# Patient Record
Sex: MALE | Race: BLACK OR AFRICAN AMERICAN
[De-identification: names, ages, dates, MRNs, and addresses within clinical notes are randomized per-mention and may not be internally consistent; named-entity substitution may affect disease eponyms.]

---

## 2018-10-09 ENCOUNTER — HOSPITAL ENCOUNTER (INPATIENT)
Dept: HOSPITAL 72 - EMR | Age: 83
LOS: 6 days | Discharge: NURSING, REHAB OR LONG-TERM CARE | DRG: 915 | End: 2018-10-15
Payer: MEDICARE

## 2018-10-09 VITALS — SYSTOLIC BLOOD PRESSURE: 146 MMHG | DIASTOLIC BLOOD PRESSURE: 83 MMHG

## 2018-10-09 VITALS — DIASTOLIC BLOOD PRESSURE: 69 MMHG | SYSTOLIC BLOOD PRESSURE: 135 MMHG

## 2018-10-09 VITALS — BODY MASS INDEX: 26.39 KG/M2 | WEIGHT: 188.5 LBS | HEIGHT: 71 IN

## 2018-10-09 VITALS — SYSTOLIC BLOOD PRESSURE: 136 MMHG | DIASTOLIC BLOOD PRESSURE: 72 MMHG

## 2018-10-09 VITALS — SYSTOLIC BLOOD PRESSURE: 143 MMHG | DIASTOLIC BLOOD PRESSURE: 73 MMHG
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
## 2018-10-09 DIAGNOSIS — Z91.013: ICD-10-CM

## 2018-10-09 DIAGNOSIS — I67.9: ICD-10-CM

## 2018-10-09 DIAGNOSIS — R26.81: ICD-10-CM

## 2018-10-09 DIAGNOSIS — G62.9: ICD-10-CM

## 2018-10-09 DIAGNOSIS — I10: ICD-10-CM

## 2018-10-09 DIAGNOSIS — I11.9: ICD-10-CM

## 2018-10-09 DIAGNOSIS — N40.0: ICD-10-CM

## 2018-10-09 DIAGNOSIS — T44.5X5A: ICD-10-CM

## 2018-10-09 DIAGNOSIS — T78.3XXA: Primary | ICD-10-CM

## 2018-10-09 DIAGNOSIS — Z91.018: ICD-10-CM

## 2018-10-09 DIAGNOSIS — J96.90: ICD-10-CM

## 2018-10-09 DIAGNOSIS — M54.9: ICD-10-CM

## 2018-10-09 DIAGNOSIS — F01.50: ICD-10-CM

## 2018-10-09 DIAGNOSIS — M54.10: ICD-10-CM

## 2018-10-09 DIAGNOSIS — G89.29: ICD-10-CM

## 2018-10-09 DIAGNOSIS — M19.90: ICD-10-CM

## 2018-10-09 DIAGNOSIS — J44.9: ICD-10-CM

## 2018-10-09 DIAGNOSIS — E78.5: ICD-10-CM

## 2018-10-09 DIAGNOSIS — Z87.891: ICD-10-CM

## 2018-10-09 DIAGNOSIS — Z95.0: ICD-10-CM

## 2018-10-09 LAB
ADD MANUAL DIFF: NO
ALBUMIN SERPL-MCNC: 3.4 G/DL (ref 3.4–5)
ALP SERPL-CCNC: 89 U/L (ref 46–116)
ALT SERPL-CCNC: 35 U/L (ref 12–78)
ANION GAP SERPL CALC-SCNC: 9 MMOL/L (ref 5–15)
APTT BLD: 30 SEC (ref 23–33)
AST SERPL-CCNC: 36 U/L (ref 15–37)
BASOPHILS NFR BLD AUTO: 0.7 % (ref 0–2)
BILIRUB SERPL-MCNC: 0.4 MG/DL (ref 0.2–1)
BUN SERPL-MCNC: 13 MG/DL (ref 7–18)
CALCIUM SERPL-MCNC: 9.7 MG/DL (ref 8.5–10.1)
CHLORIDE SERPL-SCNC: 103 MMOL/L (ref 98–107)
CO2 SERPL-SCNC: 26 MMOL/L (ref 21–32)
CREAT SERPL-MCNC: 1 MG/DL (ref 0.55–1.3)
EOSINOPHIL NFR BLD AUTO: 3.4 % (ref 0–3)
ERYTHROCYTE [DISTWIDTH] IN BLOOD BY AUTOMATED COUNT: 11.8 % (ref 11.6–14.8)
GLOBULIN SER-MCNC: 4.1 G/DL
HCT VFR BLD CALC: 42.6 % (ref 42–52)
HGB BLD-MCNC: 13.7 G/DL (ref 14.2–18)
INR PPP: 1 (ref 0.9–1.1)
LYMPHOCYTES NFR BLD AUTO: 38.2 % (ref 20–45)
MCV RBC AUTO: 97 FL (ref 80–99)
MONOCYTES NFR BLD AUTO: 12.9 % (ref 1–10)
NEUTROPHILS NFR BLD AUTO: 44.7 % (ref 45–75)
PLATELET # BLD: 293 K/UL (ref 150–450)
POTASSIUM SERPL-SCNC: 4.7 MMOL/L (ref 3.5–5.1)
RBC # BLD AUTO: 4.4 M/UL (ref 4.7–6.1)
SODIUM SERPL-SCNC: 138 MMOL/L (ref 136–145)
WBC # BLD AUTO: 5.5 K/UL (ref 4.8–10.8)

## 2018-10-09 PROCEDURE — 84484 ASSAY OF TROPONIN QUANT: CPT

## 2018-10-09 PROCEDURE — 85730 THROMBOPLASTIN TIME PARTIAL: CPT

## 2018-10-09 PROCEDURE — 85610 PROTHROMBIN TIME: CPT

## 2018-10-09 PROCEDURE — 99291 CRITICAL CARE FIRST HOUR: CPT

## 2018-10-09 PROCEDURE — 96374 THER/PROPH/DIAG INJ IV PUSH: CPT

## 2018-10-09 PROCEDURE — 71045 X-RAY EXAM CHEST 1 VIEW: CPT

## 2018-10-09 PROCEDURE — 36415 COLL VENOUS BLD VENIPUNCTURE: CPT

## 2018-10-09 PROCEDURE — 96375 TX/PRO/DX INJ NEW DRUG ADDON: CPT

## 2018-10-09 PROCEDURE — 87081 CULTURE SCREEN ONLY: CPT

## 2018-10-09 PROCEDURE — 83735 ASSAY OF MAGNESIUM: CPT

## 2018-10-09 PROCEDURE — 80053 COMPREHEN METABOLIC PANEL: CPT

## 2018-10-09 PROCEDURE — 93005 ELECTROCARDIOGRAM TRACING: CPT

## 2018-10-09 PROCEDURE — 83880 ASSAY OF NATRIURETIC PEPTIDE: CPT

## 2018-10-09 PROCEDURE — 84550 ASSAY OF BLOOD/URIC ACID: CPT

## 2018-10-09 PROCEDURE — 85025 COMPLETE CBC W/AUTO DIFF WBC: CPT

## 2018-10-09 NOTE — EMERGENCY ROOM REPORT
History of Present Illness


General


Chief Complaint:  General Complaint


Source:  Patient, Medical Record, EMS





Present Illness


HPI


83-year-old male, history of hypertension, coming from nursing home, for right 

lip and right facial swelling.  He is allergic to oranges and fish, but says 

that he did not consume this.  EMS states that nursing home said that he 

developed a right facial swelling right with swelling over the last few hours.  

Patient is asymptomatic.  No shortness of breath no feeling of his throat 

closing, no wheezing no nausea vomiting no diarrhea.  Says that this has never 

happened to him before.  Takes benazepril


Allergies:  


Uncoded Allergies:  


     ORANGES (Allergy, Unknown, 10/9/18)


     SHELLFISH (Allergy, Unknown, 10/9/18)





Patient History


Past Medical History:  see triage record


Past Surgical History:  none


Pertinent Family History:  none


Reviewed Nursing Documentation:  PMH: Agreed; PSxH: Agreed





Nursing Documentation-PMH


Past Medical History:  No History, Except For


Hx Hypertension:  Yes


Hx Cancer:  No


Hx Gastrointestinal Problems:  No


Hx Neurological Problems:  No





Review of Systems


All Other Systems:  negative except mentioned in HPI





Physical Exam





Vital Signs








  Date Time  Temp Pulse Resp B/P (MAP) Pulse Ox O2 Delivery O2 Flow Rate FiO2


 


10/9/18 14:29 98.3 64 16 134/80 94 Room Air  





 98.2       








Sp02 EP Interpretation:  reviewed, normal


General Appearance:  normal inspection, well appearing, no apparent distress, 

alert, GCS 15, non-toxic


Head:  normocephalic, atraumatic


Eyes:  bilateral eye normal inspection, bilateral eye PERRL, bilateral eye EOMI


ENT:  other - Edema of the right lip and right face, normal oropharynx, no 

tongue swelling no elevation of the floor of the mouth, no tonsillar 

enlargement or erythema, no stridor


Neck:  normal inspection, full range of motion, supple


Respiratory:  normal inspection, lungs clear, normal breath sounds, no 

respiratory distress, no retraction, no wheezing, speaking full sentences, 

chest symmetrical


Cardiovascular #1:  normal inspection, regular rate, rhythm, no edema, normal 

capillary refill


Cardiovascular #2:  2+ radial (R), 2+ radial (L)


Gastrointestinal:  normal inspection, non tender, soft, non-distended, no 

guarding


Musculoskeletal:  normal inspection, back normal, normal range of motion, non-

tender


Neurologic:  normal inspection, alert, oriented x3, responsive, motor strength/

tone normal, sensory intact, normal gait, speech normal


Psychiatric:  normal inspection, judgement/insight normal, memory normal


Skin:  normal inspection, normal color, no rash, warm/dry, well hydrated, 

normal turgor





Procedures


Critical Care Time


Critical Care Time


40 minutes of CC time


83-year-old male, angioedema, possibly secondary to ACE inhibitor use, requires 

close airway management





Anticipate admission to icu


CC time also includes review of labs, review of EMR, discussion with family and 

paperwork from SNF, d/w hospitalist


CC could include dosing of pressors, additional Abx


CC time does not include procedures





Medical Decision Making


Diagnostic Impression:  


 Primary Impression:  


 Angioedema


ER Course


83-year-old male, right facial and right lip swelling


Differential diagnosis includes possible allergic reaction versus angioedema 

from ACE inhibitor





Plan: 


Benadryl, pepcid, steroids


No epi is required at this time


Will closely observe





ER course: 


Pt given meds, satting 100 on RA


No stridor however edema is the same/persistent


Still no posterior tonsillar or uvula enlargement or swelling





Disposition:


Patient will be admitted to ICU for close airway management, discussed with Dr. Alford





EKG Diagnostic Results


EP Interpretation: Yes


Rate: 96


Rhythm: 


ST Segments t wave flatten aVL


ASA given to patient: No





Rhythm Strip


EP Interpretation: Yes


Rate: 96


Rhythm:





Chest X-ray


CXR: Ordered: Yes


1 view


Indication: Chest pain


EP interpretation: Yes


Interpretation: No consolidation, no effusion, no PTX, no acute cardiopulmonary 

disease, PPM noted


Impression: No acute disease





Electronically signed by Mick Rivers MD





Laboratory Tests








Test


  10/9/18


15:28


 


White Blood Count


  5.5 K/UL


(4.8-10.8)


 


Red Blood Count


  4.40 M/UL


(4.70-6.10)  L


 


Hemoglobin


  13.7 G/DL


(14.2-18.0)  L


 


Hematocrit


  42.6 %


(42.0-52.0)


 


Mean Corpuscular Volume 97 FL (80-99)  


 


Mean Corpuscular Hemoglobin


  31.2 PG


(27.0-31.0)  H


 


Mean Corpuscular Hemoglobin


Concent 32.2 G/DL


(32.0-36.0)


 


Red Cell Distribution Width


  11.8 %


(11.6-14.8)


 


Platelet Count


  293 K/UL


(150-450)


 


Mean Platelet Volume


  7.1 FL


(6.5-10.1)


 


Neutrophils (%) (Auto)


  44.7 %


(45.0-75.0)  L


 


Lymphocytes (%) (Auto)


  38.2 %


(20.0-45.0)


 


Monocytes (%) (Auto)


  12.9 %


(1.0-10.0)  H


 


Eosinophils (%) (Auto)


  3.4 %


(0.0-3.0)  H


 


Basophils (%) (Auto)


  0.7 %


(0.0-2.0)


 


Prothrombin Time


  10.5 SEC


(9.30-11.50)


 


Prothrombin Time INR 1.0 (0.9-1.1)  


 


PTT


  30 SEC (23-33)


 


 


Sodium Level


  138 MMOL/L


(136-145)


 


Potassium Level


  4.7 MMOL/L


(3.5-5.1)


 


Chloride Level


  103 MMOL/L


()


 


Carbon Dioxide Level


  26 MMOL/L


(21-32)


 


Anion Gap


  9 mmol/L


(5-15)


 


Blood Urea Nitrogen


  13 mg/dL


(7-18)


 


Creatinine


  1.0 MG/DL


(0.55-1.30)


 


Estimate Glomerular


Filtration Rate  mL/min (>60)  


 


 


Glucose Level


  84 MG/DL


()


 


Calcium Level


  9.7 MG/DL


(8.5-10.1)


 


Total Bilirubin


  0.4 MG/DL


(0.2-1.0)


 


Aspartate Amino Transferase


(AST) 36 U/L (15-37)


 


 


Alanine Aminotransferase (ALT)


  35 U/L (12-78)


 


 


Alkaline Phosphatase


  89 U/L


()


 


Troponin I


  0.000 ng/mL


(0.000-0.056)


 


Total Protein


  7.5 G/DL


(6.4-8.2)


 


Albumin


  3.4 G/DL


(3.4-5.0)


 


Globulin 4.1 g/dL  











Last Vital Signs








  Date Time  Temp Pulse Resp B/P (MAP) Pulse Ox O2 Delivery O2 Flow Rate FiO2


 


10/9/18 14:29 98.3 64 16 134/80 94 Room Air  





 98.2       








Disposition:  ADMITTED AS INPATIENT


Condition:  Critical











Mick Rivers M.D. Oct 9, 2018 15:08

## 2018-10-09 NOTE — DIAGNOSTIC IMAGING REPORT
Indication: Chest pain

 

Comparison:  6/16/2011

 

A single view chest radiograph was obtained.

 

Findings:

 

No definite infiltrate or pulmonary vascular congestion identified.  The heart is

normal in size. Pacemaker noted on the left. The aorta is mildly enlarged consistent

with atherosclerotic vascular disease.  The bones are osteopenic.

 

Impression:

 

No acute disease

## 2018-10-10 VITALS — DIASTOLIC BLOOD PRESSURE: 82 MMHG | SYSTOLIC BLOOD PRESSURE: 134 MMHG

## 2018-10-10 VITALS — DIASTOLIC BLOOD PRESSURE: 72 MMHG | SYSTOLIC BLOOD PRESSURE: 127 MMHG

## 2018-10-10 VITALS — DIASTOLIC BLOOD PRESSURE: 73 MMHG | SYSTOLIC BLOOD PRESSURE: 131 MMHG

## 2018-10-10 VITALS — DIASTOLIC BLOOD PRESSURE: 71 MMHG | SYSTOLIC BLOOD PRESSURE: 114 MMHG

## 2018-10-10 VITALS — SYSTOLIC BLOOD PRESSURE: 141 MMHG | DIASTOLIC BLOOD PRESSURE: 82 MMHG

## 2018-10-10 VITALS — SYSTOLIC BLOOD PRESSURE: 140 MMHG | DIASTOLIC BLOOD PRESSURE: 66 MMHG

## 2018-10-10 RX ADMIN — HEPARIN SODIUM SCH UNITS: 5000 INJECTION INTRAVENOUS; SUBCUTANEOUS at 22:23

## 2018-10-10 RX ADMIN — METOPROLOL SUCCINATE SCH MG: 25 TABLET, EXTENDED RELEASE ORAL at 09:25

## 2018-10-10 RX ADMIN — METHYLPREDNISOLONE SODIUM SUCCINATE SCH MG: 125 INJECTION, POWDER, FOR SOLUTION INTRAMUSCULAR; INTRAVENOUS at 05:56

## 2018-10-10 RX ADMIN — ALLOPURINOL SCH MG: 100 TABLET ORAL at 09:24

## 2018-10-10 RX ADMIN — HEPARIN SODIUM SCH UNITS: 5000 INJECTION INTRAVENOUS; SUBCUTANEOUS at 09:28

## 2018-10-10 RX ADMIN — METHYLPREDNISOLONE SODIUM SUCCINATE SCH MG: 125 INJECTION, POWDER, FOR SOLUTION INTRAMUSCULAR; INTRAVENOUS at 14:16

## 2018-10-10 RX ADMIN — METHYLPREDNISOLONE SODIUM SUCCINATE SCH MG: 125 INJECTION, POWDER, FOR SOLUTION INTRAMUSCULAR; INTRAVENOUS at 00:03

## 2018-10-10 RX ADMIN — METHYLPREDNISOLONE SODIUM SUCCINATE SCH MG: 125 INJECTION, POWDER, FOR SOLUTION INTRAMUSCULAR; INTRAVENOUS at 22:24

## 2018-10-10 NOTE — CARDIOLOGY REPORT
--------------- APPROVED REPORT --------------





EKG Measurement

Heart [id number]

UT [id number]

[id number]

[id number]

[id number]





Normal sinus rhythm

Septal infarct, age undetermined

Abnormal ECG

## 2018-10-10 NOTE — HISTORY AND PHYSICAL REPORT
DATE OF ADMISSION:  10/09/2018



CHIEF COMPLAINT:  Angioedema.



HISTORY OF PRESENT ILLNESS:  The patient is a pleasant 83-year-old male

well known to me.  He has a history of osteoarthritis, degenerative disk

disease, hyperlipidemia, history of conduction system disease, status post

pacemaker, stroke, and COPD who presented from his assisted living with

complaints of facial edema.  Staff had noted swelling of the face, lips

starting in the morning of admission.  The patient is on ACE inhibitor for

blood pressure control.  He presented to the emergency room.  On

evaluation there, the patient was in no respiratory distress.  No stridor.

He was started on steroids and Benadryl.  In light of the facial edema,

he is now admitted for further evaluation and care.



PAST MEDICAL HISTORY:  As above.



PAST SURGICAL HISTORY:  Includes hip surgery.



CURRENT MEDICATIONS:  Reconciled and reviewed.



ALLERGIES:  None



FAMILY HISTORY:  None.



SOCIAL HISTORY:  Negative for tobacco, ethanol, or drugs.



REVIEW OF SYSTEMS:  GENERAL:  No fevers or chills.  HEENT:  No headaches or

visual changes.  CARDIOPULMONARY:  No chest pain or shortness of breath.

GASTROINTESTINAL:  No nausea or vomiting.  GENITOURINARY:  No urgency or

frequency.    MUSCULOSKELETAL:  No joint pain or swelling.  NEUROLOGIC:

No evidence of seizures.



PHYSICAL EXAMINATION:

VITAL SIGNS:  Temperature 98 degrees, blood pressure 130/80, pulse 70,

respirations 20.

GENERAL:  The patient is  well-developed male, in no apparent distress.

HEENT:  He has facial edema and swelling of the lips mostly involving the

right side of the face.  There is no stridor noted.

NECK:  Supple.  There is no jugular venous distention.

HEART:  Regular rate and rhythm.

LUNGS:  Clear.

ABDOMEN:  Soft, nontender, nondistended.

EXTREMITIES:  Without clubbing, cyanosis, or edema.



LABORATORY DATA:  White count 5, hemoglobin 13.  Creatinine is 1.

Potassium 4.7.



ASSESSMENT:

1. This is a pleasant male admitted with complaints of facial edema and

angioedema likely secondary to ACE inhibitors.

2. Hypertension.

3. History of stroke.

4. History of osteoarthritis.

5. Conduction system disease.



PLAN:

1. Discontinue ACE inhibitor.

2. Intravenous steroids.

3. Antihistamine.

4. Supplemental oxygen.

5. Monitor airway and respiratory status closely.

6. Cardiology consultation to adjust patient's blood pressure

medications.









  ______________________________________________

  Greg Denis M.D.





DR:  Tashi

D:  10/10/2018 20:07

T:  10/10/2018 23:24

JOB#:  [id number]

CC:

## 2018-10-10 NOTE — CONSULTATION
DATE OF CONSULTATION:  10/09/2018



CONSULTING PHYSICIAN:  López Alford M.D.



REFERRING PHYSICIAN:  Greg Denis M.D.



REASON FOR CONSULT:  Facial swelling on the right side involving the lips

and suggestive of angioedema.



HISTORY OF PRESENT ILLNESS:  This 83-year-old male, who resides in an

assisted living facility.  He was seen in my office about a week ago for

routine evaluation.  No new medications were given.  Today, staff noted

him to have swelling on his right face.  It got spread to his lips and did

not resolve and also affected his ability to eat.  There was enlargement

of the tongue area and lip with no stridor or wheezing noted.  The patient

was seen in the emergency room and given IV steroids with hospitalization

initiated.



PAST MEDICAL HISTORY:  Cerebrovascular disease, dementia, degenerative disk

disease with history of diskectomy, hypertensive heart disease,

osteoarthritis, hyperlipidemia, and permanent pacemaker.  COPD.



ALLERGIES TO MEDICATIONS:  None.



FAMILY HISTORY:  Notable for hypertension in a sister and cerebrovascular

accident in his mother.



SOCIAL HISTORY:  Former smoker.  No alcohol or substance abuse.



REVIEW OF SYSTEMS:  Not obtainable reliably from the patient.



PHYSICAL EXAMINATION:

VITAL SIGNS:  Blood pressure 134/80, pulse 64, and respirations 16.

SKIN:  Pacemaker pocket site clean and dry.

HEENT:  Right face with swelling involving the lips, tongue, and cheek.  No

stridor or wheezing.

CARDIAC:  Regular rhythm and rate.  Normal S1, S2 with a fourth heart

sound.

LUNGS:  Clear.

ABDOMEN:  Soft.

EXTREMITIES:  Without edema.



DIAGNOSTIC DATA:  Pertinent data from record is reviewed. Outpatient

echocardiogram revealed normal ejection fraction, concentric hypertrophy,

and diastolic relaxation abnormality with no dysrhythmia, minimal

degenerative valvular disease.  The patient has been on

angiotensin-converting enzyme inhibitor.



Chest x-ray with no acute process.



White count 5.5, hemoglobin 13.7.  Troponin negative.  Albumin 3.4.  BUN

13, creatinine 1, and potassium 4.7.



IMPRESSION:

1. Angioedema, likely due to angiotensin-converting enzyme inhibitor.

2. Hypertensive heart disease.

3. Cerebrovascular disease with dementia.

4. No signs of respiratory insufficiency.



PLAN:

1. IV steroids and inhaled bronchodilators.

2. Avoid ACE inhibitors and ARB drugs.

3. Hydration by IV route.

4. Aspiration precautions.









  ______________________________________________

  López Alford M.D.





DR:  SATYA

D:  10/10/2018 00:52

T:  10/10/2018 01:11

JOB#:  [id number]

CC:

## 2018-10-11 VITALS — SYSTOLIC BLOOD PRESSURE: 149 MMHG | DIASTOLIC BLOOD PRESSURE: 86 MMHG

## 2018-10-11 VITALS — DIASTOLIC BLOOD PRESSURE: 92 MMHG | SYSTOLIC BLOOD PRESSURE: 155 MMHG

## 2018-10-11 VITALS — SYSTOLIC BLOOD PRESSURE: 149 MMHG | DIASTOLIC BLOOD PRESSURE: 85 MMHG

## 2018-10-11 VITALS — SYSTOLIC BLOOD PRESSURE: 142 MMHG | DIASTOLIC BLOOD PRESSURE: 86 MMHG

## 2018-10-11 VITALS — DIASTOLIC BLOOD PRESSURE: 63 MMHG | SYSTOLIC BLOOD PRESSURE: 128 MMHG

## 2018-10-11 VITALS — SYSTOLIC BLOOD PRESSURE: 140 MMHG | DIASTOLIC BLOOD PRESSURE: 66 MMHG

## 2018-10-11 RX ADMIN — METOPROLOL SUCCINATE SCH MG: 25 TABLET, EXTENDED RELEASE ORAL at 09:20

## 2018-10-11 RX ADMIN — METHYLPREDNISOLONE SODIUM SUCCINATE SCH MG: 125 INJECTION, POWDER, FOR SOLUTION INTRAMUSCULAR; INTRAVENOUS at 21:38

## 2018-10-11 RX ADMIN — TAMSULOSIN HYDROCHLORIDE SCH MG: 0.4 CAPSULE ORAL at 20:38

## 2018-10-11 RX ADMIN — ALLOPURINOL SCH MG: 100 TABLET ORAL at 09:21

## 2018-10-11 RX ADMIN — HEPARIN SODIUM SCH UNITS: 5000 INJECTION INTRAVENOUS; SUBCUTANEOUS at 09:24

## 2018-10-11 RX ADMIN — HEPARIN SODIUM SCH UNITS: 5000 INJECTION INTRAVENOUS; SUBCUTANEOUS at 20:38

## 2018-10-11 RX ADMIN — METHYLPREDNISOLONE SODIUM SUCCINATE SCH MG: 125 INJECTION, POWDER, FOR SOLUTION INTRAMUSCULAR; INTRAVENOUS at 06:39

## 2018-10-11 RX ADMIN — METHYLPREDNISOLONE SODIUM SUCCINATE SCH MG: 125 INJECTION, POWDER, FOR SOLUTION INTRAMUSCULAR; INTRAVENOUS at 15:01

## 2018-10-11 NOTE — PROGRESS NOTE
DATE:  10/10/2018



CARDIOLOGY PROGRESS NOTE



SUBJECTIVE:  The patient's swelling is slightly improved.  He has no

shortness of breath.  He has not had any difficulty overnight with

wheezing.



OBJECTIVE:

VITAL SIGNS:  Blood pressure 141/82, pulse 77, respiratory rate 20, and

afebrile.

HEENT:  Oropharynx reveals right-sided swelling involving the lower lip

more now and improved overnight.

LUNGS:  Good breath sounds bilaterally.

HEART:  Regular rhythm and rate.  Normal S1 and S2 with a fourth heart

sound.

ABDOMEN:  Soft.

EXTREMITIES:  No edema.



IMPRESSION:

1. Angioedema, likely secondary to ACE inhibitor therapy although he has

been on it for sometime that this is still the most likely possibility.

2. Hypertensive heart disease.

3. Chronic obstructive pulmonary disease.

4. Cerebrovascular disease.

5. Permanent pacemaker.



PLAN:

1. No resumption of ACE inhibitor.

2. Blood pressure management using amlodipine.

3. Outpatient pacemaker interrogation.

4. Steroid taper.









  ______________________________________________

  López Alford M.D.





DR:  LM

D:  10/11/2018 02:28

T:  10/11/2018 02:32

JOB#:  [id number]

CC:

## 2018-10-12 VITALS — DIASTOLIC BLOOD PRESSURE: 89 MMHG | SYSTOLIC BLOOD PRESSURE: 145 MMHG

## 2018-10-12 VITALS — SYSTOLIC BLOOD PRESSURE: 159 MMHG | DIASTOLIC BLOOD PRESSURE: 98 MMHG

## 2018-10-12 VITALS — SYSTOLIC BLOOD PRESSURE: 133 MMHG | DIASTOLIC BLOOD PRESSURE: 78 MMHG

## 2018-10-12 VITALS — SYSTOLIC BLOOD PRESSURE: 151 MMHG | DIASTOLIC BLOOD PRESSURE: 85 MMHG

## 2018-10-12 VITALS — DIASTOLIC BLOOD PRESSURE: 90 MMHG | SYSTOLIC BLOOD PRESSURE: 157 MMHG

## 2018-10-12 VITALS — SYSTOLIC BLOOD PRESSURE: 144 MMHG | DIASTOLIC BLOOD PRESSURE: 90 MMHG

## 2018-10-12 RX ADMIN — METOPROLOL SUCCINATE SCH MG: 50 TABLET, EXTENDED RELEASE ORAL at 08:34

## 2018-10-12 RX ADMIN — ALLOPURINOL SCH MG: 100 TABLET ORAL at 08:33

## 2018-10-12 RX ADMIN — HEPARIN SODIUM SCH UNITS: 5000 INJECTION INTRAVENOUS; SUBCUTANEOUS at 08:35

## 2018-10-12 RX ADMIN — TAMSULOSIN HYDROCHLORIDE SCH MG: 0.4 CAPSULE ORAL at 20:17

## 2018-10-12 RX ADMIN — METHYLPREDNISOLONE SODIUM SUCCINATE SCH MG: 125 INJECTION, POWDER, FOR SOLUTION INTRAMUSCULAR; INTRAVENOUS at 05:56

## 2018-10-12 RX ADMIN — HEPARIN SODIUM SCH UNITS: 5000 INJECTION INTRAVENOUS; SUBCUTANEOUS at 20:08

## 2018-10-12 RX ADMIN — METHYLPREDNISOLONE SODIUM SUCCINATE SCH MG: 125 INJECTION, POWDER, FOR SOLUTION INTRAMUSCULAR; INTRAVENOUS at 14:42

## 2018-10-12 NOTE — GENERAL PROGRESS NOTE
Assessment/Plan


Problem List:  


(1) Angioedema


ICD Codes:  T78.3XXA - Angioneurotic edema, initial encounter


SNOMED:  59923119


(2) Atrial fibrillation


ICD Codes:  I48.91 - Unspecified atrial fibrillation


SNOMED:  05328714


Status:  stable, progressing


Assessment/Plan


switch to po steroids


benadryl


resp care


pt/ot


dc planning





Subjective


ROS Limited/Unobtainable:  No


Constitutional:  Reports: malaise, weakness


HEENT:  Reports: no symptoms


Cardiovascular:  Reports: no symptoms


Respiratory:  Reports: no symptoms


Gastrointestinal/Abdominal:  Reports: no symptoms


Genitourinary:  Reports: no symptoms


Neurologic/Psychiatric:  Reports: no symptoms


Endocrine:  Reports: no symptoms


Hematologic/Lymphatic:  Reports: no symptoms


Allergies:  


Uncoded Allergies:  


     ORANGES (Allergy, Unknown, 10/9/18)


     SHELLFISH (Allergy, Unknown, 10/9/18)


All Systems:  reviewed and negative except above


Subjective


facial edema mostly resolved. no sob. no chest pain


weak. family wants pt to go to skilled section of [facility].





Objective





Last 24 Hour Vital Signs








  Date Time  Temp Pulse Resp B/P (MAP) Pulse Ox O2 Delivery O2 Flow Rate FiO2


 


10/12/18 12:00  65      


 


10/12/18 08:34  88  159/98    


 


10/12/18 08:33  88  159/98    


 


10/12/18 08:00  64      


 


10/12/18 08:00      Room Air  


 


10/12/18 08:00 97.3 88 18 159/98 (118) 95   





 97.3       


 


10/12/18 04:00      Room Air  


 


10/12/18 04:00 98.5 66 22 151/85 (107) 95   





 98.5       


 


10/12/18 03:32  66      


 


10/12/18 00:00 97.5 68 18 157/90 (112) 96   





 97.5       


 


10/12/18 00:00      Room Air  


 


10/11/18 20:09  72      


 


10/11/18 20:00 98.3 70 18 149/86 (107) 96   





 98.3       


 


10/11/18 20:00      Room Air  


 


10/11/18 16:00      Room Air  


 


10/11/18 16:00 97.0 91 20 128/63 (84) 95   





 97.0       


 


10/11/18 16:00  84      

















Intake and Output  


 


 10/11/18 10/12/18





 19:00 07:00


 


Intake Total 120 ml 300 ml


 


Output Total 340 ml 300 ml


 


Balance -220 ml 0 ml


 


  


 


Intake Oral 120 ml 300 ml


 


Output Urine Total 340 ml 300 ml


 


# Voids 1 3








Height (Feet):  5


Height (Inches):  11.00


Weight (Pounds):  188


General Appearance:  WD/WN, alert


Neck:  supple


Cardiovascular:  regular rhythm


Respiratory/Chest:  chest wall non-tender, lungs clear, normal breath sounds


Abdomen:  normal bowel sounds, non tender, soft, no organomegaly


Edema:  no edema noted Arm (L), no edema noted Arm (R), no edema noted Leg (L), 

no edema noted Leg (R), no edema noted Pedal (L), no edema noted Pedal (R), no 

edema noted Generalized











Greg Denis MD Oct 12, 2018 14:59

## 2018-10-12 NOTE — PROGRESS NOTE
DATE:  10/11/2018



CARDIOLOGY PROGRESS NOTE



SUBJECTIVE:  Swelling decreasing.  The patient has episodes of agitation.

No difficulty swallowing or shortness of breath.



OBJECTIVE:

VITAL SIGNS:  Blood pressure 149/86, pulse 70, respiratory rate 18.

 

LUNGS:  Clear.

CARDIAC:  Regular.

ABDOMEN:  Soft, no edema.



Monitored rhythm sinus with paroxysms of atrial fibrillation and

ventricular pacing.



IMPRESSION:

1. Angioedema, improving.

2. Hypertensive heart disease.

3. Permanent pacemaker.

4. Degenerative disk disease.

5. Peripheral neuropathy, unsteady gait.



PLAN:

1. Taper steroids.

2. No resumption of ACE inhibitor.

3. Titrate amlodipine.

4. Physical and occupational therapy assessments.

5. Discharge planning.

6. Possible short stay at skilled nursing facility for rehabilitation.









  ______________________________________________

  López Alford M.D.





DR:  TIFF

D:  10/12/2018 02:18

T:  10/12/2018 02:47

JOB#:  [id number]

CC:

## 2018-10-13 VITALS — SYSTOLIC BLOOD PRESSURE: 143 MMHG | DIASTOLIC BLOOD PRESSURE: 90 MMHG

## 2018-10-13 VITALS — SYSTOLIC BLOOD PRESSURE: 130 MMHG | DIASTOLIC BLOOD PRESSURE: 74 MMHG

## 2018-10-13 VITALS — SYSTOLIC BLOOD PRESSURE: 146 MMHG | DIASTOLIC BLOOD PRESSURE: 81 MMHG

## 2018-10-13 VITALS — DIASTOLIC BLOOD PRESSURE: 89 MMHG | SYSTOLIC BLOOD PRESSURE: 146 MMHG

## 2018-10-13 VITALS — SYSTOLIC BLOOD PRESSURE: 134 MMHG | DIASTOLIC BLOOD PRESSURE: 88 MMHG

## 2018-10-13 VITALS — DIASTOLIC BLOOD PRESSURE: 84 MMHG | SYSTOLIC BLOOD PRESSURE: 137 MMHG

## 2018-10-13 VITALS — SYSTOLIC BLOOD PRESSURE: 155 MMHG | DIASTOLIC BLOOD PRESSURE: 88 MMHG

## 2018-10-13 RX ADMIN — HEPARIN SODIUM SCH UNITS: 5000 INJECTION INTRAVENOUS; SUBCUTANEOUS at 20:30

## 2018-10-13 RX ADMIN — HEPARIN SODIUM SCH UNITS: 5000 INJECTION INTRAVENOUS; SUBCUTANEOUS at 08:10

## 2018-10-13 RX ADMIN — METOPROLOL SUCCINATE SCH MG: 50 TABLET, EXTENDED RELEASE ORAL at 08:23

## 2018-10-13 RX ADMIN — TAMSULOSIN HYDROCHLORIDE SCH MG: 0.4 CAPSULE ORAL at 20:28

## 2018-10-13 RX ADMIN — ALLOPURINOL SCH MG: 100 TABLET ORAL at 08:10

## 2018-10-13 NOTE — PROGRESS NOTE
DATE:  10/12/2018



CARDIOLOGY PROGRESS NOTE



SUBJECTIVE:  The patient was seen and evaluated.  Case was discussed with

his sister, Melly Polo on phone.  The patient's facial swelling has

decreased.  He is having no shortness of breath or difficulty

swallowing.



OBJECTIVE:

VITAL SIGNS:  Blood pressure 159/98, pulse 88, and respirations 20.

LUNGS:  Clear.

CARDIAC:  Regular.  Normal S1 and S2 with a fourth heart sound.

ABDOMEN:  Soft.

EXTREMITIES:  No edema.



Right ___ with minimal of swelling left.  Monitor is atrial paced.



IMPRESSION:

1. Angioedema, likely due to ACE inhibitor, possibly due to alternate

agent such as dietary products.

2. Hypertensive heart disease with rising blood pressure trend.

3. Permanent pacemaker.

4. Degenerative disk disease.

5. Chronic obstructive pulmonary disease with no active bronchospasm.



PLAN:

1. Discontinue IV fluids expect improvement in blood pressure control

and steroids are tapered off.

2. Maintained antihypertensive control with metoprolol and amlodipine

for now.

3. Avoid ACE inhibitor.

4. Discharge planning.









  ______________________________________________

  López Alford M.D.





DR:  DARI

D:  10/13/2018 02:06

T:  10/13/2018 02:38

JOB#:  [id number]

CC:

## 2018-10-13 NOTE — GENERAL PROGRESS NOTE
Assessment/Plan


Problem List:  


(1) Angioedema


ICD Codes:  T78.3XXA - Angioneurotic edema, initial encounter


SNOMED:  31799139


(2) Atrial fibrillation


ICD Codes:  I48.91 - Unspecified atrial fibrillation


SNOMED:  13838625


Status:  stable, progressing


Assessment/Plan


po steroids


benadryl


off acei


resp care


pt/ot


dc planning





Subjective


ROS Limited/Unobtainable:  No


Constitutional:  Reports: malaise, weakness


HEENT:  Reports: no symptoms


Cardiovascular:  Reports: no symptoms


Respiratory:  Reports: no symptoms


Gastrointestinal/Abdominal:  Reports: no symptoms


Genitourinary:  Reports: no symptoms


Neurologic/Psychiatric:  Reports: no symptoms


Endocrine:  Reports: no symptoms


Hematologic/Lymphatic:  Reports: no symptoms


Allergies:  


Uncoded Allergies:  


     ORANGES (Allergy, Unknown, 10/9/18)


     SHELLFISH (Allergy, Unknown, 10/9/18)


All Systems:  reviewed and negative except above


Subjective


facial edema resolved. no sob. no chest pain


weak. family wants pt to go to skilled section of [facility].


unclear if bed available or not





Objective





Last 24 Hour Vital Signs








  Date Time  Temp Pulse Resp B/P (MAP) Pulse Ox O2 Delivery O2 Flow Rate FiO2


 


10/13/18 08:23  69  143/90    


 


10/13/18 08:23  69  143/90    


 


10/13/18 04:00 97.5 68 18 146/81 (102) 96   





 97.5       


 


10/13/18 04:00  60      


 


10/13/18 00:00  69      


 


10/13/18 00:00 98.4 64 18 130/74 (92) 94   





 98.4       


 


10/12/18 21:00      Room Air  


 


10/12/18 20:00 98.3 68 20 144/90 (108) 94   





 98.3       


 


10/12/18 20:00  65      


 


10/12/18 16:00  66      


 


10/12/18 16:00 97.6 67 18 145/89 (107) 89   





 97.6       


 


10/12/18 12:00 98.0 72 18 133/78 (96) 95   





 98.0       


 


10/12/18 12:00  65      

















Intake and Output  


 


 10/12/18 10/13/18





 19:00 07:00


 


Intake Total 800 ml 


 


Output Total 1100 ml 300 ml


 


Balance -300 ml -300 ml


 


  


 


Intake Oral 800 ml 


 


Output Urine Total 1100 ml 300 ml


 


# Voids  2








Height (Feet):  5


Height (Inches):  11.00


Weight (Pounds):  188


Objective


General Appearance:  WD/WN, alert


Neck:  supple


Cardiovascular:  regular rhythm


Respiratory/Chest:  chest wall non-tender, lungs clear, normal breath sounds


Abdomen:  normal bowel sounds, non tender, soft, no organomegaly


Edema:  no edema noted Arm (L), no edema noted Arm (R), no edema noted Leg (L), 

no edema noted Leg (R), no edema noted Pedal (L), no edema noted Pedal (R), no 

edema noted Generalized











Greg Denis MD Oct 13, 2018 08:41

## 2018-10-13 NOTE — PROGRESS NOTE
DATE:  10/13/2018



CARDIOLOGY PROGRESS NOTE



SUBJECTIVE:  Facial edema has resolved.  No shortness of breath or chest

pain.



OBJECTIVE:

VITAL SIGNS:  Blood pressure 143/90, pulse 70, respiratory rate 18.

Monitored rhythm atrial-paced.

HEENT:  Oropharynx clear with no exudate or thrush.  No facial

edema.

NECK:  No stridor.

LUNGS:  Good breath sounds.  No wheezing.

CARDIAC:  Regular rhythm and rate.  Normal S1, S2 with no new

murmur.

ABDOMEN:  Soft.

EXTREMITIES:  No edema.



IMPRESSION:

1. Angioedema, resolved.

2. Hypertensive heart disease with improved blood pressure control.

3. Functional decline.

4. Unsteady gait.

5. Degenerative disk disease with radiculopathy.

6. Permanent pacemaker.

7. COPD.



PLAN:

1. Mobilize.

2. Physical therapy.

3. Off ACE inhibitor and no plan for resumption.

4. Titrate other cardiovascular medications as needed based on blood

pressure and clinical parameters.

5. Recheck laboratory studies including natriuretic peptide assay.

6. Discharge plan to skilled nursing facility when bed available.









  ______________________________________________

  López Alford M.D. DR:  Fatemeh

D:  10/13/2018 18:00

T:  10/13/2018 20:19

JOB#:  [id number]

CC:

## 2018-10-14 VITALS — DIASTOLIC BLOOD PRESSURE: 91 MMHG | SYSTOLIC BLOOD PRESSURE: 145 MMHG

## 2018-10-14 VITALS — SYSTOLIC BLOOD PRESSURE: 133 MMHG | DIASTOLIC BLOOD PRESSURE: 83 MMHG

## 2018-10-14 VITALS — SYSTOLIC BLOOD PRESSURE: 145 MMHG | DIASTOLIC BLOOD PRESSURE: 67 MMHG

## 2018-10-14 VITALS — SYSTOLIC BLOOD PRESSURE: 119 MMHG | DIASTOLIC BLOOD PRESSURE: 65 MMHG

## 2018-10-14 VITALS — SYSTOLIC BLOOD PRESSURE: 146 MMHG | DIASTOLIC BLOOD PRESSURE: 86 MMHG

## 2018-10-14 VITALS — DIASTOLIC BLOOD PRESSURE: 86 MMHG | SYSTOLIC BLOOD PRESSURE: 146 MMHG

## 2018-10-14 VITALS — DIASTOLIC BLOOD PRESSURE: 81 MMHG | SYSTOLIC BLOOD PRESSURE: 141 MMHG

## 2018-10-14 LAB
ALBUMIN SERPL-MCNC: 3 G/DL (ref 3.4–5)
ALBUMIN/GLOB SERPL: 0.8 {RATIO} (ref 1–2.7)
ALP SERPL-CCNC: 76 U/L (ref 46–116)
ALT SERPL-CCNC: 74 U/L (ref 12–78)
ANION GAP SERPL CALC-SCNC: 8 MMOL/L (ref 5–15)
AST SERPL-CCNC: 62 U/L (ref 15–37)
BILIRUB SERPL-MCNC: 0.4 MG/DL (ref 0.2–1)
BUN SERPL-MCNC: 25 MG/DL (ref 7–18)
CALCIUM SERPL-MCNC: 9.4 MG/DL (ref 8.5–10.1)
CHLORIDE SERPL-SCNC: 107 MMOL/L (ref 98–107)
CO2 SERPL-SCNC: 26 MMOL/L (ref 21–32)
CREAT SERPL-MCNC: 0.9 MG/DL (ref 0.55–1.3)
GLOBULIN SER-MCNC: 4 G/DL
POTASSIUM SERPL-SCNC: 3.4 MMOL/L (ref 3.5–5.1)
SODIUM SERPL-SCNC: 141 MMOL/L (ref 136–145)

## 2018-10-14 RX ADMIN — ALLOPURINOL SCH MG: 100 TABLET ORAL at 08:38

## 2018-10-14 RX ADMIN — HEPARIN SODIUM SCH UNITS: 5000 INJECTION INTRAVENOUS; SUBCUTANEOUS at 20:32

## 2018-10-14 RX ADMIN — METOPROLOL SUCCINATE SCH MG: 50 TABLET, EXTENDED RELEASE ORAL at 08:34

## 2018-10-14 RX ADMIN — HEPARIN SODIUM SCH UNITS: 5000 INJECTION INTRAVENOUS; SUBCUTANEOUS at 08:39

## 2018-10-14 NOTE — PROGRESS NOTE
DATE:  10/14/2018



CARDIOLOGY PROGRESS NOTE



SUBJECTIVE:  The patient is without any residual facial edema.  No

shortness of breath.  Swelling has resolved.  Appetite is good.  No

difficulty swallowing.  No chest pain.



OBJECTIVE:

VITAL SIGNS:  Blood pressure 141/81, pulse 63, respiratory rate 20.

LUNGS:  Clear.

CARDIAC:  Regular rhythm and rate.  Normal S1, S2 with a fourth heart

sound.

ABDOMEN:  Soft.

EXTREMITIES:  No edema.

SKIN:  Pacemaker pocket site clean and dry.  Right face is without any

swelling.



LABORATORY DATA:  Potassium 3.4, BUN 25, creatinine 0.9.  Pro-natriuretic

peptide 255.  Albumin is 3.



IMPRESSION:

1. Angioedema, probably due to ACE inhibitor therapy.

2. Hypertensive heart disease.

3. Hypokalemia.

4. Mild protein-calorie malnutrition.

5. Chronic diastolic congestive heart failure.

6. Hypertensive heart disease.

7. COPD.



PLAN:

1. No ACE inhibitor or ARB therapy.

2. Taper off steroids.

3. Continue anti-platelet therapy with statin drug.

4. Titrate amlodipine and metoprolol for blood pressure management.

5. Discontinue Flomax.  We will consider doxazosin in place of Flomax if

additional antihypertensive therapy needed in the future.









  ______________________________________________

  LIZA Watson

D:  10/14/2018 12:48

T:  10/14/2018 16:25

JOB#:  [id number]

CC:

## 2018-10-14 NOTE — GENERAL PROGRESS NOTE
Assessment/Plan


Problem List:  


(1) Angioedema


ICD Codes:  T78.3XXA - Angioneurotic edema, initial encounter


SNOMED:  63834335


(2) Atrial fibrillation


ICD Codes:  I48.91 - Unspecified atrial fibrillation


SNOMED:  44442901


Assessment/Plan


po steroids- dc


benadryl


off acei


resp care


pt/ot


dc planning





Subjective


ROS Limited/Unobtainable:  No


Constitutional:  Reports: weakness


HEENT:  Reports: no symptoms


Cardiovascular:  Reports: no symptoms


Respiratory:  Reports: no symptoms


Gastrointestinal/Abdominal:  Reports: no symptoms


Genitourinary:  Reports: no symptoms


Neurologic/Psychiatric:  Reports: no symptoms


Endocrine:  Reports: no symptoms


Hematologic/Lymphatic:  Reports: no symptoms


Allergies:  


Uncoded Allergies:  


     ORANGES (Allergy, Unknown, 10/9/18)


     SHELLFISH (Allergy, Unknown, 10/9/18)


All Systems:  reviewed and negative except above


Subjective


facial edema resolved. no sob. no chest pain


weak. family wants pt to go to skilled section of [facility].


unclear if bed available or not





Objective





Last 24 Hour Vital Signs








  Date Time  Temp Pulse Resp B/P (MAP) Pulse Ox O2 Delivery O2 Flow Rate FiO2


 


10/14/18 04:00 97.7 63 20 141/81 (101) 98   





 97.7       


 


10/14/18 00:00 98.2 63 20 145/67 (93) 96   





 98.2       


 


10/13/18 21:00      Room Air  


 


10/13/18 21:00 98.0 62 20 155/88 (110) 94   





 98.0       


 


10/13/18 20:00 98.1 65 20 146/89 (108) 94   





 98.1       


 


10/13/18 16:00 98.0 64 20 137/84 (101) 96   





 98.0       


 


10/13/18 16:00  67      


 


10/13/18 12:00  65      


 


10/13/18 12:00 98.1 63 18 134/88 (103) 96   





 98.1       


 


10/13/18 09:00      Room Air  

















Intake and Output  


 


 10/13/18 10/14/18





 19:00 07:00


 


Intake Total 400 ml 


 


Output Total 200 ml 450 ml


 


Balance 200 ml -450 ml


 


  


 


Intake Oral 400 ml 


 


Output Urine Total 200 ml 450 ml


 


# Voids 3 2








Laboratory Tests


10/14/18 06:30: 


Sodium Level [Pending], Potassium Level [Pending], Chloride Level [Pending], 

Carbon Dioxide Level [Pending], Blood Urea Nitrogen [Pending], Creatinine [

Pending], Estimat Glomerular Filtration Rate [Pending], Glucose Level [Pending]

, Uric Acid [Pending], Calcium Level [Pending], Magnesium Level [Pending], 

Total Bilirubin [Pending], Aspartate Amino Transf (AST/SGOT) [Pending], Alanine 

Aminotransferase (ALT/SGPT) [Pending], Alkaline Phosphatase [Pending], Pro-B-

Type Natriuretic Peptide [Pending], Total Protein [Pending], Albumin [Pending], 

Globulin [Pending]


Height (Feet):  5


Height (Inches):  11.00


Weight (Pounds):  188


Objective


General Appearance:  WD/WN, alert


Neck:  supple


Cardiovascular:  regular rhythm


Respiratory/Chest:  chest wall non-tender, lungs clear, normal breath sounds


Abdomen:  normal bowel sounds, non tender, soft, no organomegaly


Edema:  no edema noted Arm (L), no edema noted Arm (R), no edema noted Leg (L), 

no edema noted Leg (R), no edema noted Pedal (L), no edema noted Pedal (R), no 

edema noted Generalized











Greg Denis MD Oct 14, 2018 08:23

## 2018-10-15 VITALS — DIASTOLIC BLOOD PRESSURE: 71 MMHG | SYSTOLIC BLOOD PRESSURE: 123 MMHG

## 2018-10-15 VITALS — DIASTOLIC BLOOD PRESSURE: 72 MMHG | SYSTOLIC BLOOD PRESSURE: 143 MMHG

## 2018-10-15 VITALS — SYSTOLIC BLOOD PRESSURE: 110 MMHG | DIASTOLIC BLOOD PRESSURE: 70 MMHG

## 2018-10-15 VITALS — SYSTOLIC BLOOD PRESSURE: 146 MMHG | DIASTOLIC BLOOD PRESSURE: 84 MMHG

## 2018-10-15 VITALS — DIASTOLIC BLOOD PRESSURE: 82 MMHG | SYSTOLIC BLOOD PRESSURE: 143 MMHG

## 2018-10-15 RX ADMIN — ALLOPURINOL SCH MG: 100 TABLET ORAL at 08:35

## 2018-10-15 RX ADMIN — METOPROLOL SUCCINATE SCH MG: 50 TABLET, EXTENDED RELEASE ORAL at 08:35

## 2018-10-15 RX ADMIN — HEPARIN SODIUM SCH UNITS: 5000 INJECTION INTRAVENOUS; SUBCUTANEOUS at 08:38

## 2018-10-16 NOTE — PROGRESS NOTE
DATE:  10/15/2018



CARDIOLOGY PROGRESS NOTE



SUBJECTIVE:  The patient is off the ACE inhibitor since admission.  Facial

swelling has resolved.  No shortness of breath or difficulty with

breathing or swallowing.



OBJECTIVE:

VITAL SIGNS:  Blood pressure 143/72, pulse 64, respiratory rate 18, and

afebrile.

HEENT:  Oropharynx with no swelling.  Lips and cheek without any residual

swelling on the right.

LUNGS:  Clear.

CARDIAC:  Regular rhythm and rate.  Normal S1 and S2 with a fourth heart

sound.

ABDOMEN:  Soft.  No edema.



IMPRESSION:

1. Angioedema, resolved, likely due to ACE inhibitor.

2. Hypertensive heart disease, permanent pacemaker.

3. COPD.

4. Unsteady gait.

5. Degenerative disk disease.



PLAN:  Maintain current cardiovascular regimen without ACE inhibitors or

ARB drugs in the future.  Discussed with family members as well.

Presently, no indication for additional blood pressure titration, but we

will need to follow closely at skilled nursing facility and adjust

accordingly.  Maintain anti-platelet and anti-lipid drugs, long-term.









  ______________________________________________

  López Alford M.D.





DR:  YUE

D:  10/15/2018 22:32

T:  10/16/2018 02:42

JOB#:  [id number]

CC:

## 2018-10-16 NOTE — DISCHARGE SUMMARY
DATE OF ADMISSION:  10/09/2018



DATE OF DISCHARGE:  10/15/2018



ADMISSION DIAGNOSES:

1. Angioedema.

2. Respiratory failure.

3. Hypertension.

4. History of chronic back pain.

5. Benign prostatic hypertrophy.

6. Hyperlipidemia.



DISCHARGE DIAGNOSES:

1. Angioedema.

2. Respiratory failure.

3. Hypertension.

4. History of chronic back pain.

5. Benign prostatic hypertrophy.

6. Hyperlipidemia.



HOSPITAL COURSE:  The patient was admitted with a severe case of angioedema

secondary to ACE inhibitors.  He was admitted initially to a monitored

unit because of concern about his airway.  He received supplemental

oxygen.  He received intravenous steroids, Benadryl, and antihistamines.

He gradually improved over several days.  He is of course was

discontinued.  On discharge, he was stable.  Family requested he go to

skilled nursing facility for short-term rehab.  Please refer to the

skilled nursing facility _____ assisted living.  The patient is to follow

up in one to two days.



DISCHARGE MEDICATIONS:  Please see discharge medications list for discharge

medications



DIET:  Cardiac diet.



ACTIVITY:  Ad-yin.









  ______________________________________________

  Greg Denis M.D.





DR:  MIRIAN

D:  10/15/2018 12:23

T:  10/16/2018 03:32

JOB#:  [id number]

CC: